# Patient Record
Sex: MALE | Race: WHITE
[De-identification: names, ages, dates, MRNs, and addresses within clinical notes are randomized per-mention and may not be internally consistent; named-entity substitution may affect disease eponyms.]

---

## 2018-09-05 ENCOUNTER — HOSPITAL ENCOUNTER (EMERGENCY)
Dept: HOSPITAL 97 - ER | Age: 43
Discharge: HOME | End: 2018-09-05
Payer: COMMERCIAL

## 2018-09-05 DIAGNOSIS — Z88.5: ICD-10-CM

## 2018-09-05 DIAGNOSIS — R09.1: Primary | ICD-10-CM

## 2018-09-05 DIAGNOSIS — F17.210: ICD-10-CM

## 2018-09-05 LAB
BUN BLD-MCNC: 10 MG/DL (ref 7–18)
CKMB CREATINE KINASE MB: 1.3 NG/ML (ref 0.3–3.6)
GLUCOSE SERPLBLD-MCNC: 92 MG/DL (ref 74–106)
HCT VFR BLD CALC: 47.8 % (ref 39.6–49)
INR BLD: 0.9
LYMPHOCYTES # SPEC AUTO: 3.6 K/UL (ref 0.7–4.9)
MAGNESIUM SERPL-MCNC: 2.2 MG/DL (ref 1.8–2.4)
MCH RBC QN AUTO: 28.7 PG (ref 27–35)
MCV RBC: 83.5 FL (ref 80–100)
NT-PROBNP SERPL-MCNC: 55 PG/ML (ref ?–125)
PMV BLD: 9.9 FL (ref 7.6–11.3)
POTASSIUM SERPL-SCNC: 3.6 MMOL/L (ref 3.5–5.1)
RBC # BLD: 5.73 M/UL (ref 4.33–5.43)
TROPONIN (EMERG DEPT USE ONLY): < 0.02 NG/ML (ref 0–0.04)

## 2018-09-05 PROCEDURE — 83880 ASSAY OF NATRIURETIC PEPTIDE: CPT

## 2018-09-05 PROCEDURE — 85379 FIBRIN DEGRADATION QUANT: CPT

## 2018-09-05 PROCEDURE — 84484 ASSAY OF TROPONIN QUANT: CPT

## 2018-09-05 PROCEDURE — 85025 COMPLETE CBC W/AUTO DIFF WBC: CPT

## 2018-09-05 PROCEDURE — 83735 ASSAY OF MAGNESIUM: CPT

## 2018-09-05 PROCEDURE — 82553 CREATINE MB FRACTION: CPT

## 2018-09-05 PROCEDURE — 99285 EMERGENCY DEPT VISIT HI MDM: CPT

## 2018-09-05 PROCEDURE — 85610 PROTHROMBIN TIME: CPT

## 2018-09-05 PROCEDURE — 82550 ASSAY OF CK (CPK): CPT

## 2018-09-05 PROCEDURE — 81003 URINALYSIS AUTO W/O SCOPE: CPT

## 2018-09-05 PROCEDURE — 71275 CT ANGIOGRAPHY CHEST: CPT

## 2018-09-05 PROCEDURE — 85730 THROMBOPLASTIN TIME PARTIAL: CPT

## 2018-09-05 PROCEDURE — 36415 COLL VENOUS BLD VENIPUNCTURE: CPT

## 2018-09-05 PROCEDURE — 96374 THER/PROPH/DIAG INJ IV PUSH: CPT

## 2018-09-05 PROCEDURE — 80048 BASIC METABOLIC PNL TOTAL CA: CPT

## 2018-09-05 PROCEDURE — 93005 ELECTROCARDIOGRAM TRACING: CPT

## 2018-09-05 PROCEDURE — 71046 X-RAY EXAM CHEST 2 VIEWS: CPT

## 2018-09-05 NOTE — RAD REPORT
EXAM DESCRIPTION:  RAD - Chest Pa And Lat (2 Views) - 9/5/2018 10:46 am

 

CLINICAL HISTORY:  CHEST PAIN

Chest pain.

 

COMPARISON:  Chest Single View dated 5/26/2017; CHEST SINGLE VIEW dated 10/28/2015; CHEST PA AND LAT 
2 VIEW dated 7/8/2013; ABDOMEN 1 VIEW KUB dated 7/8/2013

 

FINDINGS:  The lungs are clear. The heart is normal in size. No displaced fractures.

 

IMPRESSION:  No acute or concerning finding suspected.

## 2018-09-05 NOTE — RAD REPORT
EXAM DESCRIPTION:  CT - Chest For Pe Angio - 9/5/2018 1:02 pm

 

CLINICAL HISTORY:  Chest pain.

Chest pain;Dyspnea

 

COMPARISON:  No comparisons

 

TECHNIQUE:  CT angiogram of the pulmonary arteries was performed with MIP.

 

All CT scans are performed using dose optimization technique as appropriate and may include automated
 exposure control or mA/KV adjustment according to patient size.

 

FINDINGS:  A large amount of respiratory motion artifact is present. Vague poorly defined filling def
ects are seen in the upper lobe segmental branches of the pulmonary arterial tree bilaterally, these 
are favored to be secondary to respiratory artifact rather than true pulmonary emboli.

 

No acute aortic finding demonstrated.

 

The lungs are clear.

 

No significant pericardial or pleural fluid.

 

No concerning bony finding. Small hiatal hernia.

 

IMPRESSION:  Subtle vague filling defects are seen in the upper lobe segmental pulmonary arterial luther
e branches. These are favored to represent artifact from respiratory motion rather than true pulmonar
y emboli. However, small pulmonary emboli are difficult to completely rule out. It is recommended sheldon
t the patient undergo a V/Q scan for further assessment.

 

No acute lung findings.

## 2018-09-05 NOTE — ER
Nurse's Notes                                                                                     

 [facility]                                                                

Name: Yevgeniy Schneider                                                                              

Age: 42 yrs                                                                                       

Sex: Male                                                                                         

: 1975                                                                                   

MRN: [id number]                                                                                   

Arrival Date: 2018                                                                          

Time: 09:51                                                                                       

Account#: [id number]                                                                            

Bed 17                                                                                            

Private MD: Alexey Cordero T                                                                        

Diagnosis: Pleurisy                                                                               

                                                                                                  

Presentation:                                                                                     

                                                                                             

10:00 Presenting complaint: Patient states: pain to left lateral aspect of chest that began   aa5 

      Thursday only with inspiration. Pt states "I had this pain about 4 years ago and they       

      said my lung was inflamed". Transition of care: patient was not received from another       

      setting of care. Onset of symptoms was . Risk Assessment: Do you want to hurt           

      yourself or someone else? Patient reports no desire to harm self or others. Initial         

      Sepsis Screen: Does the patient meet any 2 criteria? No. Patient's initial sepsis           

      screen is negative. Does the patient have a suspected source of infection? No.              

      Patient's initial sepsis screen is negative. Care prior to arrival: None.                   

10:00 Method Of Arrival: Ambulatory                                                           aa5 

10:00 Acuity: ZANDER 3                                                                           aa5 

                                                                                                  

Historical:                                                                                       

- Allergies:                                                                                      

10:07 Morphine;                                                                               aa5 

- PMHx:                                                                                           

10:07 Back pain;                                                                              aa5 

- PSHx:                                                                                           

10:07 Knee surgery;                                                                           aa5 

                                                                                                  

- Immunization history:: Adult Immunizations up to date.                                          

- Social history:: Smoking status: Patient uses tobacco products, smokes two packs                

  cigarettes per day.                                                                             

- Ebola Screening: : No symptoms or risks identified at this time.                                

                                                                                                  

                                                                                                  

Screening:                                                                                        

10:10 Abuse screen: Denies threats or abuse. Denies injuries from another. Nutritional        jl7 

      screening: No deficits noted. Tuberculosis screening: No symptoms or risk factors           

      identified. Fall Risk IV access (20 points). Total Ramos Fall Scale indicates No Risk       

      (0-24 pts).                                                                                 

                                                                                                  

Assessment:                                                                                       

10:10 General: Appears in no apparent distress. uncomfortable, Behavior is cooperative,       jl7 

      anxious. Pain: Complains of pain in left breast Pain currently is 3 out of 10 on a pain     

      scale. at worst was 7 out of 10 on a pain scale. Quality of pain is described as sharp,     

      Is continuous, Aggravated by Taking a deep breath makes it worse. Neuro: Level of           

      Consciousness is awake, alert, obeys commands, Oriented to person, place, time,             

      situation. Cardiovascular: Heart tones present Patient's skin is warm and dry. Rhythm       

      is regular. Respiratory: Airway is patent Respiratory effort is even, unlabored,            

      Respiratory pattern is regular, symmetrical, Breath sounds are clear bilaterally. GI:       

      No signs and/or symptoms were reported involving the gastrointestinal system. : No        

      signs and/or symptoms were reported regarding the genitourinary system. EENT: No signs      

      and/or symptoms were reported regarding the EENT system. Derm: Skin is pink, warm \T\       

      dry. Musculoskeletal: No signs and/or symptoms reported regarding the musculoskeletal       

      system.                                                                                     

11:00 Reassessment: Patient and/or family updated on plan of care and expected duration. Pain jl7 

      level reassessed. Patient is alert, oriented x 3, equal unlabored respirations, skin        

      warm/dry/pink.                                                                              

11:45 Reassessment: Pt reports decreased pain, "I think the medication helped it out.".       jl7 

12:13 Reassessment: Pt's wife states "Are we not even going to see an actual doctor."         jl7 

      Educated pt and family that ERP's work under and attending ERP an that the ERD is aware     

      and approves of all care and diagnostics provided to the pt. Pt and family request to       

      see the ERD. ERP and ERD notified at this time.                                             

13:00 Reassessment: Patient appears in no apparent distress at this time. Patient and/or      jl7 

      family updated on plan of care and expected duration. Pain level reassessed. Patient is     

      alert, oriented x 3, equal unlabored respirations, skin warm/dry/pink.                      

14:00 Reassessment: Dr. Dozier at bedside discussing plan of care.                          jl7 

                                                                                                  

Vital Signs:                                                                                      

10:00  / 80; Pulse 75; Resp 20 S; Temp 98.8(TE); Pulse Ox 100% on R/A; Pain 10/10;      aa5 

11:00  / 79; Pulse 68; Resp 18; Pulse Ox 97% on R/A;                                    jl7 

12:10  / 80; Pulse 69; Resp 16; Pulse Ox 95% on R/A;                                    jl7 

13:15  / 85; Pulse 64; Resp 12; Pulse Ox 100% ;                                         jl7 

14:24  / 84; Pulse 65; Resp 16; Pulse Ox 99% on R/A;                                    jl7 

                                                                                                  

ED Course:                                                                                        

09:51 Patient arrived in ED.                                                                  mr  

09:51 Alexey Cordero MD is Private Physician.                                                   mr  

10:00 Arm band placed on Patient placed in an exam room, on a stretcher.                      aa5 

10:03 [facility]                                                        kb  

10:03 Zach Dozier MD is Attending Physician.                                             kb  

10:06 Triage completed.                                                                       aa5 

10:10 Sergo Adams, LEATHA is Primary Nurse.                                                      jl7 

10:27 EKG done, by EKG tech. reviewed by Padmini NASSAR.                               at1 

10:30 Patient has correct armband on for positive identification. Placed in gown. Bed in low  jl7 

      position. Call light in reach. Side rails up X 1. Cardiac monitor on. Pulse ox on. NIBP     

      on. Warm blanket given.                                                                     

10:30 Initial lab(s) drawn, by me, sent to lab. Urine collected: clean catch specimen, clear. jl7 

      Inserted saline lock: 20 gauge in right antecubital area, using aseptic technique.          

      Blood collected.                                                                            

10:40 Chest Pa And Lat (2 Views) XRAY In Process Unspecified.                                 EDMS

12:10 Alexey Cordero MD is Referral Physician.                                                  kb  

13:02 CT Chest For PE Angio In Process Unspecified.                                           EDMS

14:10 Alexey Cordero MD is Referral Physician.                                                  kb  

14:10 Hernán Olguin MD is Referral Physician.                                             kb  

14:24 No provider procedures requiring assistance completed. IV discontinued, intact,         jl7 

      bleeding controlled, No redness/swelling at site. Pressure dressing applied.                

                                                                                                  

Administered Medications:                                                                         

11:10 Drug: SOLU-Medrol 125 mg Route: IVP; Site: right antecubital;                           jl7 

11:45 Follow up: Response: No adverse reaction; Pain is decreased                             jl7 

                                                                                                  

                                                                                                  

Outcome:                                                                                          

12:11 Discharge ordered by MD.                                                                kb  

14:10 Discharge ordered by MD.                                                                kb  

14:24 Discharged to home ambulatory, with family.                                             jl7 

14:24 Condition: stable                                                                           

14:24 Discharge instructions given to patient, family, Instructed on discharge instructions,      

      follow up and referral plans. medication usage, Demonstrated understanding of               

      instructions, follow-up care, medications, Prescriptions given X 2.                         

14:25 Patient left the ED.                                                                    jl7 

                                                                                                  

Signatures:                                                                                       

Dispatcher [employer]                           EDMS                                                 

Padmini Mccall FNP-C FNP-Ester Ruiz                                                                                   

Rose Ledezma, RN                     RN   aa5                                                  

Renée Joel, EKG Tech              EKG Tat1                                                  

Sergo Adams, RN                        RN   jl7                                                  

                                                                                                  

**************************************************************************************************

## 2018-09-05 NOTE — EKG
Test Date:    2018-09-05               Test Time:    10:24:00

Technician:   BAR                                   

                                                     

MEASUREMENT RESULTS:                                       

Intervals:                                           

Rate:         65                                     

HI:           136                                    

QRSD:         92                                     

QT:           384                                    

QTc:          399                                    

Axis:                                                

P:            59                                     

HI:           136                                    

QRS:          64                                     

T:            56                                     

                                                     

INTERPRETIVE STATEMENTS:                                       

                                                     

Normal sinus rhythm

Normal ECG

Compared to ECG 05/26/2017 07:07:10

No significant changes



Electronically Signed On 09-05-18 17:50:48 CDT by Dilip Macedo

## 2018-09-05 NOTE — EDPHYS
Physician Documentation                                                                           

 [facility]                                                                

Name: Yevgeniy Schneider                                                                              

Age: 42 yrs                                                                                       

Sex: Male                                                                                         

: 1975                                                                                   

MRN: [id number]                                                                                   

Arrival Date: 2018                                                                          

Time: 09:51                                                                                       

Account#: [id number]                                                                            

Bed 17                                                                                            

Private MD: Alexey Cordero T                                                                        

ED Physician Zach Dozier                                                                      

HPI:                                                                                              

                                                                                             

10:20 This 42 yrs old  Male presents to ER via Ambulatory with complaints of         kb  

      Breathing Difficulty.                                                                       

10:21 The patient or guardian reports chest pain that is located primarily in the anterior    kb  

      chest wall, left. Onset: 7 day(s) ago. The pain does not radiate. Associated signs and      

      symptoms: The patient has no apparent associated signs or symptoms. The chest pain is       

      described as sharp, stabbing. Duration: The patient or guardian reports a single            

      episode, that is still ongoing, wax/wane. Modifying factors: The symptoms are               

      alleviated by nothing. the symptoms are aggravated by cough, deep breath, movement.         

      Severity of pain: At its worst the pain was moderate in the emergency department the        

      pain is unchanged. The patient has experienced similar episodes in the past, several        

      times, and the symptoms today are exactly the same, to when the patient was apparently      

      diagnosed with "inflamed lung". The patient has not recently seen a physician. Pt           

      states he has an inflamed lung. States he had this same thing 12 years ago and again        

      4-5 years ago. States this time it started on Thursday and it has been waxing and           

      waning, but not going away completely. Pain worse with deep inspiration, movement,          

      cough. Pt smokes 1.5 packs/day. .                                                           

                                                                                                  

Historical:                                                                                       

- Allergies:                                                                                      

10:07 Morphine;                                                                               aa5 

- PMHx:                                                                                           

10:07 Back pain;                                                                              aa5 

- PSHx:                                                                                           

10:07 Knee surgery;                                                                           aa5 

                                                                                                  

- Immunization history:: Adult Immunizations up to date.                                          

- Social history:: Smoking status: Patient uses tobacco products, smokes two packs                

  cigarettes per day.                                                                             

- Ebola Screening: : No symptoms or risks identified at this time.                                

                                                                                                  

                                                                                                  

ROS:                                                                                              

10:17 Constitutional: Negative for fever, chills, and weight loss, Abdomen/GI: Negative for   kb  

      abdominal pain, nausea, vomiting, diarrhea, and constipation, Back: Negative for injury     

      and pain, : Negative for injury, bleeding, discharge, and swelling, MS/Extremity:         

      Negative for injury and deformity, Skin: Negative for injury, rash, and discoloration,      

      Neuro: Negative for headache, weakness, numbness, tingling, and seizure.                    

10:17 Cardiovascular: Positive for chest pain, with cough, with movement, of the left breast,     

      Negative for edema, orthopnea, palpitations, paroxysmal nocturnal dyspnea.                  

10:17 Respiratory: Positive for pleurisy, of the left breast.                                     

                                                                                                  

Exam:                                                                                             

10:20 Constitutional:  This is a well developed, well nourished patient who is awake, alert,  kb  

      and in no acute distress. Head/Face:  Normocephalic, atraumatic. Chest/axilla:  Normal      

      chest wall appearance and motion.  Nontender with no deformity.  No lesions are             

      appreciated. Cardiovascular:  Regular rate and rhythm with a normal S1 and S2.  No          

      gallops, murmurs, or rubs.  Normal PMI, no JVD.  No pulse deficits. Respiratory:  Lungs     

      have equal breath sounds bilaterally, clear to auscultation and percussion.  No rales,      

      rhonchi or wheezes noted.  No increased work of breathing, no retractions or nasal          

      flaring. Abdomen/GI:  Soft, non-tender, with normal bowel sounds.  No distension or         

      tympany.  No guarding or rebound.  No evidence of tenderness throughout. Back:  No          

      spinal tenderness.  No costovertebral tenderness.  Full range of motion. Skin:  Warm,       

      dry with normal turgor.  Normal color with no rashes, no lesions, and no evidence of        

      cellulitis. MS/ Extremity:  Pulses equal, no cyanosis.  Neurovascular intact.  Full,        

      normal range of motion. Neuro:  Awake and alert, GCS 15, oriented to person, place,         

      time, and situation.  Cranial nerves II-XII grossly intact.  Motor strength 5/5 in all      

      extremities.  Sensory grossly intact.  Cerebellar exam normal.  Normal gait.                

10:20 Respiratory: obvious pain with deep inspiration.                                            

                                                                                                  

Vital Signs:                                                                                      

10:00  / 80; Pulse 75; Resp 20 S; Temp 98.8(TE); Pulse Ox 100% on R/A; Pain 10/10;      aa5 

11:00  / 79; Pulse 68; Resp 18; Pulse Ox 97% on R/A;                                    jl7 

12:10  / 80; Pulse 69; Resp 16; Pulse Ox 95% on R/A;                                    jl7 

13:15  / 85; Pulse 64; Resp 12; Pulse Ox 100% ;                                         jl7 

14:24  / 84; Pulse 65; Resp 16; Pulse Ox 99% on R/A;                                    jl7 

                                                                                                  

MDM:                                                                                              

10:03 Patient medically screened.                                                             kb  

10:19 Data reviewed: vital signs, nurses notes. Data interpreted: Pulse oximetry: on room air kb  

      is 100 %. Interpretation: normal.                                                           

12:09 Counseling: I had a detailed discussion with the patient and/or guardian regarding: the kb  

      historical points, exam findings, and any diagnostic results supporting the                 

      discharge/admit diagnosis, lab results, radiology results, the need for outpatient          

      follow up, a family practitioner, to return to the emergency department if symptoms         

      worsen or persist or if there are any questions or concerns that arise at home.             

12:09 ED course: Pain has subsided at this time.                                              kb  

                                                                                                  

                                                                                             

10:14 Order name: Basic Metabolic Panel; Complete Time: 11:52                                 kb  

                                                                                             

10:14 Order name: CBC with Diff; Complete Time: 12:08                                         kb  

                                                                                             

10:14 Order name: Ckmb; Complete Time: 11:52                                                  kb  

                                                                                             

10:14 Order name: CPK; Complete Time: 11:52                                                   kb  

                                                                                             

10:14 Order name: Magnesium; Complete Time: 11:52                                             kb  

                                                                                             

10:14 Order name: NT PRO-BNP; Complete Time: 11:52                                            kb  

                                                                                             

10:14 Order name: PT-INR; Complete Time: 12:00                                                kb  

                                                                                             

10:14 Order name: Ptt, Activated; Complete Time: 12:00                                        kb  

                                                                                             

10:14 Order name: Troponin (emerg Dept Use Only); Complete Time: 11:52                        kb  

                                                                                             

10:14 Order name: Chest Pa And Lat (2 Views) XRAY; Complete Time: 10:53                       kb  

                                                                                             

10:14 Order name: D-Dimer; Complete Time: 12:00                                               kb  

                                                                                             

10:24 Order name: Urine Dipstick--Ancillary (enter results); Complete Time: 13:48             em1 

                                                                                             

12:14 Order name: CT Chest For PE Angio; Complete Time: 13:20                                 kb  

                                                                                             

10:14 Order name: EKG; Complete Time: 10:15                                                   kb  

                                                                                             

10:14 Order name: Cardiac monitoring; Complete Time: 10:39                                    kb  

                                                                                             

10:14 Order name: EKG - Nurse/Tech; Complete Time: 10:39                                      kb  

                                                                                             

10:14 Order name: IV Saline Lock; Complete Time: 10:39                                        kb  

                                                                                             

10:14 Order name: Labs collected and sent; Complete Time: 10:39                               kb  

                                                                                             

10:14 Order name: O2 Per Protocol; Complete Time: 10:39                                       kb  

                                                                                             

10:14 Order name: O2 Sat Monitoring; Complete Time: 10:39                                     kb  

                                                                                             

10:14 Order name: Urine Dipstick-Ancillary (obtain specimen); Complete Time: 10:23            kb  

                                                                                             

10:52 Order name: Labs - recollect needed; Complete Time: 11:09                               bd  

                                                                                                  

Administered Medications:                                                                         

11:10 Drug: SOLU-Medrol 125 mg Route: IVP; Site: right antecubital;                           [facility] 

11:45 Follow up: Response: No adverse reaction; Pain is decreased                             jl7 

                                                                                                  

                                                                                                  

Disposition:                                                                                      

                                                                                             

06:45 Co-signature as Attending Physician, Zach Dozier MD I agree with the assessment and  maria elena 

      plan of care.                                                                               

                                                                                                  

Disposition:                                                                                      

18 14:10 Discharged to Home. Impression: Pleurisy.                                          

- Condition is Stable.                                                                            

- Discharge Instructions: Pleurisy, Easy-to-Read.                                                 

- Prescriptions for Prednisone 20 mg Oral Tablet - take 1 tablet by ORAL route once               

  daily for 5 days; 5 tablet. Zithromax Z- Jorgito 250 mg Oral Tablet - take 1 tablet by              

  ORAL route as directed for 5 days Day 1 - take two (2) tablets one time. Day 2, 3, 4            

  , 5 take one (1) tablet once daily.; 6 tablet.                                                  

- Medication Reconciliation Form, Thank You Letter, Antibiotic Education, Prescription            

  Opioid Use form.                                                                                

- Follow up: Emergency Department; When: As needed; Reason: Worsening of condition.               

  Follow up: Alexey Cordero MD; When: 2 - 3 days; Reason: Recheck today's complaints,               

  Continuance of care, Re-evaluation by your physician. Follow up: Hernán Olguin MD; When: 2 - 3 days; Reason: Recheck today's complaints, Continuance of care,                  

  Re-evaluation by your physician.                                                                

                                                                                                  

                                                                                                  

                                                                                                  

Signatures:                                                                                       

Dispatcher [employer]                           EDMS                                                 

Padmini Mccall, DANYC                 FNHARPER-Ange Stanley Corey, MD MD cha Calderon, Audri, RN                     RN   aa5                                                  

Sergo Adams RN                        RN   jl7                                                  

                                                                                                  

Corrections: (The following items were deleted from the chart)                                    

                                                                                             

13:48 12:11 2018 12:11 Discharged to Home. Impression: Pleurisy. Condition is Stable.   kb  

      Forms are Medication Reconciliation Form, Thank You Letter, Antibiotic Education,           

      Prescription Opioid Use. Follow up: Emergency Department; When: As needed; Reason:          

      Worsening of condition. Follow up: Alexey Cordero; When: 2 - 3 days; Reason: Recheck            

      today's complaints, Continuance of care, Re-evaluation by your physician. kb                

14:25 14:10 2018 14:10 Discharged to Home. Impression: Pleurisy. Condition is Stable.   jl7 

      Prescriptions for Prednisone 20 mg Oral Tablet - take 1 tablet by ORAL route once daily     

      for 5 days; 5 tablet. and Forms are Medication Reconciliation Form, Thank You Letter,       

      Antibiotic Education, Prescription Opioid Use. Follow up: Emergency Department; When:       

      As needed; Reason: Worsening of condition. Follow up: Alexey Cordero; When: 2 - 3 days;         

      Reason: Recheck today's complaints, Continuance of care, Re-evaluation by your              

      physician. Follow up: Hernán Olguin; When: 2 - 3 days; Reason: Recheck today's            

      complaints, Continuance of care, Re-evaluation by your physician. kb                        

                                                                                                  

**************************************************************************************************